# Patient Record
Sex: MALE
[De-identification: names, ages, dates, MRNs, and addresses within clinical notes are randomized per-mention and may not be internally consistent; named-entity substitution may affect disease eponyms.]

---

## 2020-11-02 ENCOUNTER — TRANSCRIPTION ENCOUNTER (OUTPATIENT)
Age: 40
End: 2020-11-02

## 2020-12-18 ENCOUNTER — TRANSCRIPTION ENCOUNTER (OUTPATIENT)
Age: 40
End: 2020-12-18

## 2021-01-11 ENCOUNTER — TRANSCRIPTION ENCOUNTER (OUTPATIENT)
Age: 41
End: 2021-01-11

## 2021-04-26 ENCOUNTER — TRANSCRIPTION ENCOUNTER (OUTPATIENT)
Age: 41
End: 2021-04-26

## 2024-02-06 PROBLEM — Z00.00 ENCOUNTER FOR PREVENTIVE HEALTH EXAMINATION: Status: ACTIVE | Noted: 2024-02-06

## 2024-02-21 ENCOUNTER — APPOINTMENT (OUTPATIENT)
Dept: ORTHOPEDIC SURGERY | Facility: CLINIC | Age: 44
End: 2024-02-21
Payer: OTHER MISCELLANEOUS

## 2024-02-21 VITALS — BODY MASS INDEX: 21.7 KG/M2 | HEIGHT: 71 IN | WEIGHT: 155 LBS

## 2024-02-21 DIAGNOSIS — S69.91XA UNSPECIFIED INJURY OF RIGHT WRIST, HAND AND FINGER(S), INITIAL ENCOUNTER: ICD-10-CM

## 2024-02-21 PROCEDURE — 99204 OFFICE O/P NEW MOD 45 MIN: CPT

## 2024-02-21 PROCEDURE — 73130 X-RAY EXAM OF HAND: CPT | Mod: RT

## 2024-02-21 NOTE — ASSESSMENT
[FreeTextEntry1] : The patient comes in after an injury to his right hand. On 9/28/23, while at work the patient was pulling a hand malgorzata with a 160 lbs. object and he felt a pop in his hand. The patient states he has a dull pain. The patient has been wearing a brace.  The patient states worker's comp kept switching his doctors.  He had multiple x-rays taken which were negative and then he had an MRI done. The patient states he was given an anti-inflammatory last week but does not recall the name. The patient has been going to therapy. This is his second opinion. The patient right hand dominant.  RUE: No gross deformities visualized, nontender palpation over base of third metacarpal, tender palpation between fourth and fifth metacarpals, full range of motion of fingers, no subluxation of the extensor tendons, neurovascular intact  X-RAY right hand 3 views is negative  MRI shows third metacarpal base fracture with evidence for healing. CMC alignment is satisfactory.  junctural injury demonstrated in the left fourth interspace with in appreciable extensor tendon abnormality    Patient had a base of the third metacarpal fracture which is healed and had a gingival injury.  I believe the pain that he is having is secondary to the juncture all injury.  His tendons are intact so I do not believe there is any need to move forward with any surgery for this injury but I believe therapy would be beneficial.  The patient will continue with therapy. The patient will wear the brace when he is doing anything strenuous. The patient will call if he wants to follow up.